# Patient Record
Sex: MALE | Race: WHITE
[De-identification: names, ages, dates, MRNs, and addresses within clinical notes are randomized per-mention and may not be internally consistent; named-entity substitution may affect disease eponyms.]

---

## 2019-04-17 LAB
BUN BLD-MCNC: 14 MG/DL (ref 7–18)
GLUCOSE SERPLBLD-MCNC: 92 MG/DL (ref 74–106)
HCT VFR BLD CALC: 45.7 % (ref 39.6–49)
LYMPHOCYTES # SPEC AUTO: 1.5 K/UL (ref 0.7–4.9)
PMV BLD: 7.5 FL (ref 7.6–11.3)
POTASSIUM SERPL-SCNC: 4 MMOL/L (ref 3.5–5.1)
RBC # BLD: 5.09 M/UL (ref 4.33–5.43)

## 2019-04-17 NOTE — EKG
Test Date:    2019-04-17               Test Time:    14:01:18

Technician:   BECKY                                     

                                                     

MEASUREMENT RESULTS:                                       

Intervals:                                           

Rate:         59                                     

OH:           160                                    

QRSD:         102                                    

QT:           414                                    

QTc:          409                                    

Axis:                                                

P:            70                                     

OH:           160                                    

QRS:          -57                                    

T:            53                                     

                                                     

INTERPRETIVE STATEMENTS:                                       

                                                     

Sinus bradycardia

Left anterior fascicular block

Abnormal ECG

No previous ECG available for comparison



Electronically Signed On 04-17-19 17:49:06 CDT by Elton Torres

## 2019-04-17 NOTE — RAD REPORT
EXAM DESCRIPTION:  RAD - Chest Pa And Lat (2 Views) - 4/17/2019 2:14 pm

 

CLINICAL HISTORY:  PRE OP

Chest pain.

 

COMPARISON:  No comparisons

 

FINDINGS:  The lungs are clear. The heart is normal in size. No displaced fractures.

 

IMPRESSION:  No acute or concerning finding suspected.

## 2019-04-22 ENCOUNTER — HOSPITAL ENCOUNTER (OUTPATIENT)
Dept: HOSPITAL 97 - OR | Age: 64
Discharge: HOME | End: 2019-04-22
Attending: SURGERY
Payer: COMMERCIAL

## 2019-04-22 DIAGNOSIS — I10: ICD-10-CM

## 2019-04-22 DIAGNOSIS — K40.90: Primary | ICD-10-CM

## 2019-04-22 PROCEDURE — 71046 X-RAY EXAM CHEST 2 VIEWS: CPT

## 2019-04-22 PROCEDURE — 0YU50JZ SUPPLEMENT RIGHT INGUINAL REGION WITH SYNTHETIC SUBSTITUTE, OPEN APPROACH: ICD-10-PCS

## 2019-04-22 PROCEDURE — 93005 ELECTROCARDIOGRAM TRACING: CPT

## 2019-04-22 PROCEDURE — 80048 BASIC METABOLIC PNL TOTAL CA: CPT

## 2019-04-22 PROCEDURE — 36415 COLL VENOUS BLD VENIPUNCTURE: CPT

## 2019-04-22 PROCEDURE — 88302 TISSUE EXAM BY PATHOLOGIST: CPT

## 2019-04-22 PROCEDURE — 85025 COMPLETE CBC W/AUTO DIFF WBC: CPT

## 2019-04-22 PROCEDURE — 49505 PRP I/HERN INIT REDUC >5 YR: CPT

## 2019-04-22 RX ADMIN — HYDROMORPHONE HYDROCHLORIDE ONE MG: 1 INJECTION, SOLUTION INTRAMUSCULAR; INTRAVENOUS; SUBCUTANEOUS at 11:19

## 2019-04-22 RX ADMIN — HYDROMORPHONE HYDROCHLORIDE ONE MG: 1 INJECTION, SOLUTION INTRAMUSCULAR; INTRAVENOUS; SUBCUTANEOUS at 11:26

## 2019-04-22 NOTE — OP
Date of Procedure:  04/22/2019



Surgeon:  Clem Cody MD



Assistant:  DAVID Manzanares



Preoperative Diagnosis:  Right inguinal hernia.



Postoperative Diagnosis:  Right inguinal hernia.



Procedure:  Repair of right inguinal hernia.



Estimated Blood Loss:  Minimal.



Specimen:  Hernia sac.



Findings:  As above.



Anesthesia:  General.



Complications:  None.



Disposition:  The patient tolerated the procedure in stable condition and taken to the Recovery in go
od general condition.



Procedure In Detail:  The patient was brought to the OR and placed in supine position.  General anest
hesia was begun.  The patient was prepped and draped in the usual sterile fashion.  Marcaine 0.5% was
 infiltrated in a field block fashion in the right groin.  A 15-blade was used to make a 4 cm oblique
 incision between the pubic tubercle and the anterior iliac superior spine.  Subcutaneous tissue divi
ded.  Jacinta's fascia was identified and divided.  Aponeurosis was identified and mobilized inferiorl
y to expose the shelving edge then the aponeurosis was opened through the external ring.  Ilioinguina
l nerve was identified and retracted out of the field of dissection.  Cord mobilized, skeletonized an
d large indirect sac present, freed from the surrounding tissue with sharp and blunt dissection.  Hig
h ligation of the sac done with 2-0 Prolene suture ligature and free hand tie.  Hernia sac excised an
d sent to Pathology as specimen.  Marlex mesh plug placed in the new internal ring secured with Versa
Tack stapler and then Onlay mesh was then placed on the inguinal floor secured medially to the pubic 
tubercle, inferiorly to the shelving edge, superiorly to conjoint tendon, laterally to each other.  T
hen, cord structures and ilioinguinal nerve were placed back in anatomical location and 2-0 Prolene u
sed to close the aponeurosis, 3-0 chromic was used to reapproximate Jacinta's fascia as well as to yaakov
se skin.  Sterile dressing was applied.  The patient was awakened and taken to Recovery in good gener
al condition.



Discharge:  The patient will go to Day Surgery, then home when stable.



Disposition:  Home.



Condition:  Stable.



Discharge Instructions:  Resume home medications and diet.  Activities as tolerated.  No heavy liftin
g.  Remove outer dressing in 2 days.  Shower.  Keep wound clean and dry.  Keep Steri-Strips on at all
 times.  Follow up in my office next week.  Call for appointment. Tylenol No. 3 one tablet p.o. q.4 p
.r.n. pain.  Ice pack and scrotal support.





AS/SHU

DD:  04/22/2019 10:54:11Voice ID:  [id number]

DT:  04/22/2019 12:14:19Report ID:  [id number]